# Patient Record
Sex: MALE | Race: WHITE | Employment: FULL TIME | URBAN - NONMETROPOLITAN AREA
[De-identification: names, ages, dates, MRNs, and addresses within clinical notes are randomized per-mention and may not be internally consistent; named-entity substitution may affect disease eponyms.]

---

## 2024-10-23 ENCOUNTER — HOSPITAL ENCOUNTER (EMERGENCY)
Age: 39
Discharge: HOME | End: 2024-10-24
Attending: EMERGENCY MEDICINE

## 2024-10-23 VITALS
DIASTOLIC BLOOD PRESSURE: 86 MMHG | HEART RATE: 64 BPM | OXYGEN SATURATION: 99 % | HEIGHT: 74 IN | SYSTOLIC BLOOD PRESSURE: 136 MMHG | BODY MASS INDEX: 35.29 KG/M2 | RESPIRATION RATE: 16 BRPM | TEMPERATURE: 98.4 F | WEIGHT: 275 LBS

## 2024-10-23 DIAGNOSIS — B35.6 TINEA CRURIS: ICD-10-CM

## 2024-10-23 DIAGNOSIS — B35.3 TINEA PEDIS OF BOTH FEET: Primary | ICD-10-CM

## 2024-10-23 LAB — GLUCOSE BLD-MCNC: 108 MG/DL (ref 75–110)

## 2024-10-23 PROCEDURE — 82947 ASSAY GLUCOSE BLOOD QUANT: CPT

## 2024-10-23 PROCEDURE — 99283 EMERGENCY DEPT VISIT LOW MDM: CPT

## 2024-10-23 RX ORDER — CALCIUM CARB/VITAMIN D3/VIT K1 500-100-40
TABLET,CHEWABLE ORAL
Qty: 130 G | Refills: 2 | Status: SHIPPED | OUTPATIENT
Start: 2024-10-23

## 2024-10-23 ASSESSMENT — LIFESTYLE VARIABLES
HOW OFTEN DO YOU HAVE A DRINK CONTAINING ALCOHOL: NEVER
HOW MANY STANDARD DRINKS CONTAINING ALCOHOL DO YOU HAVE ON A TYPICAL DAY: PATIENT DOES NOT DRINK

## 2024-10-23 ASSESSMENT — PAIN - FUNCTIONAL ASSESSMENT: PAIN_FUNCTIONAL_ASSESSMENT: 0-10

## 2024-10-23 ASSESSMENT — PAIN SCALES - GENERAL: PAINLEVEL_OUTOF10: 7

## 2024-10-23 ASSESSMENT — PAIN DESCRIPTION - LOCATION: LOCATION: FOOT

## 2024-10-23 ASSESSMENT — PAIN DESCRIPTION - ORIENTATION: ORIENTATION: RIGHT;LEFT

## 2024-10-24 ASSESSMENT — ENCOUNTER SYMPTOMS
VOMITING: 0
SHORTNESS OF BREATH: 0
NAUSEA: 0

## 2024-10-24 NOTE — DISCHARGE INSTRUCTIONS
Please help with the PCP of your choice within 2 days.  Please apply Tinactin spray to the affected areas 2 times a day and continue for 1 week after symptoms resolved.  This is expected to be 4 to 6 weeks.  Return to the emergency department for worsening of symptoms, fevers, nausea/vomiting or any other acute concerns.

## 2024-10-24 NOTE — ED PROVIDER NOTES
[facility] ED  [address] [facility] [address]  Phone: [phone]  eMERGENCY dEPARTMENT eNCOUnter      Pt Name: Missael Castanon  MRN: [id number]  Birthdate 1985  Date of evaluation: 10/24/24      CHIEF COMPLAINT     Chief Complaint   Patient presents with    Rash     Rash to groin area     Foot Pain     Bilateral foot pain for the past 3 days        HISTORY OF PRESENT ILLNESS    Missael Castanon is a 39 y.o. male who presents who presents today for evaluation of bilateral foot pain and rash as well as a rash in the left groin.  Patient indicates that he has had itching and peeling for the past several days.  States that he has maybe had this once before.  Has tried topical lotion but no other over-the-counter medications.  Patient has a history of diabetes states that he was previously not tolerating metformin.  Patient indicates that he started a new job recently.  His boots are causing his feet to hurt when he is standing for long periods of time.  No recent chest pain shortness of breath fevers chills or other acute symptoms.     REVIEW OF SYSTEMS       Review of Systems   Constitutional:  Negative for fever.   Respiratory:  Negative for shortness of breath.    Cardiovascular:  Negative for chest pain.   Gastrointestinal:  Negative for nausea and vomiting.   Skin:  Positive for rash.        PAST MEDICAL HISTORY    has no past medical history on file.    SURGICAL HISTORY      has no past surgical history on file.    CURRENT MEDICATIONS       Discharge Medication List as of 10/24/2024 12:04 AM          ALLERGIES     is allergic to pcn [penicillins].    FAMILY HISTORY     has no family status information on file.      family history is not on file.    SOCIAL HISTORY          PHYSICAL EXAM     INITIAL VITALS:  height is 1.88 m (6' 2\") and weight is 124.7 kg (275 lb). His temperature is 98.4 °F (36.9 °C). His blood pressure is 136/86 and his pulse is 64. His respiration is 16 and oxygen